# Patient Record
Sex: FEMALE | Race: WHITE
[De-identification: names, ages, dates, MRNs, and addresses within clinical notes are randomized per-mention and may not be internally consistent; named-entity substitution may affect disease eponyms.]

---

## 2018-04-10 ENCOUNTER — HOSPITAL ENCOUNTER (EMERGENCY)
Dept: HOSPITAL 58 - ED | Age: 28
Discharge: HOME | End: 2018-04-10
Payer: COMMERCIAL

## 2018-04-10 VITALS — TEMPERATURE: 98.1 F | DIASTOLIC BLOOD PRESSURE: 58 MMHG | SYSTOLIC BLOOD PRESSURE: 112 MMHG

## 2018-04-10 VITALS — BODY MASS INDEX: 23.5 KG/M2

## 2018-04-10 DIAGNOSIS — N20.1: Primary | ICD-10-CM

## 2018-04-10 DIAGNOSIS — Z87.440: ICD-10-CM

## 2018-04-10 PROCEDURE — 84703 CHORIONIC GONADOTROPIN ASSAY: CPT

## 2018-04-10 PROCEDURE — 96375 TX/PRO/DX INJ NEW DRUG ADDON: CPT

## 2018-04-10 PROCEDURE — 81001 URINALYSIS AUTO W/SCOPE: CPT

## 2018-04-10 PROCEDURE — 96374 THER/PROPH/DIAG INJ IV PUSH: CPT

## 2018-04-10 PROCEDURE — 80053 COMPREHEN METABOLIC PANEL: CPT

## 2018-04-10 PROCEDURE — 36415 COLL VENOUS BLD VENIPUNCTURE: CPT

## 2018-04-10 PROCEDURE — 99283 EMERGENCY DEPT VISIT LOW MDM: CPT

## 2018-04-10 PROCEDURE — 85025 COMPLETE CBC W/AUTO DIFF WBC: CPT

## 2018-04-10 NOTE — ED.PDOC
General


ED Provider: 


Dr. KATERYNA VALENZUELA





Chief Complaint: Urinary Problem


Stated Complaint: Severe LLQ abdominal Pain and Lt Flank Pain. SUDDEN ONSET 

APPROXIMATELY 1 HOUR AGO. FEELS LIKE A UTI WITH BLADDE PRESSURE AND BURNING. 

TREATED FOR UTI LAST MONTH AT PCP'S OFFICE. HX OF PREV UTI SIMILAR SYMPTOMS.


Time Seen by Physician: 17:10


Mode of Arrival: Walk-In


Information Source: Patient


Exam Limitations: No limitations


[facility] 


[facility]





Nursing and Triage Documentation Reviewed and Agree: Yes


Reviewed sepsis parameters & appropriate labs ordered?: Yes


System Inflammatory Response Syndrome: Not Applicable


Sepsis Protocol: 


For patient's 13 years and over:





Temp is 96.8 and below  and greater


Pulse >90 BPM


Resp >20/minute


Acutely Altered Mental Status





Are patient's symptoms suggestive of a new infection, such as:


   -Pneumonia


   -Skin, Soft Tissue


   -Endocarditis


   -UTI


   -Bone, Joint Infection


   -Implantable Device


   -Acute Abdominal Infection


   -Wound Infection


   -Meningitis


   -Blood Stream Catheter Infection


   -Unknown





System Inflammatory Response Syndrome: Not Applicable





 Complaint Exam





- UTI Female Complaint/Exam


Patient Complains of: Reports: Painful urination


Onset/Duration: 1 HOUR


Symptoms Are: Worse


Timing: Constant


Initial Severity: Severe


Current Severity: Moderate


Location of Pain: Reports: Left, Flank, Suprapubic


Associated Signs and Symptoms: Reports: Flank pain.  Denies: Fever, Chills, 

Dyspareunia, Vaginal discharge


: 1


Para: 1


Related History: Reports: Similar episode


Related Surgical History: Reports: None


CVA Tenderness: Yes


Suprapubic Tenderness: Yes


Differential Diagnoses: Bladder Dysfunction, Cystitis, Pyelonephritis, Ureteral 

Calculus





Review of Systems





- Review Of Systems


Constitutional: Reports: No symptoms


Eyes: Reports: No symptoms


Ears, Nose, Mouth, Throat: Reports: No symptoms


Respiratory: Reports: No symptoms


Cardiac: Reports: No symptoms


GI: Reports: No symptoms


: Reports: No symptoms, Burning, Dysuria, Frequency, Flank pain, Pain, Urgency


Musculoskeletal: Reports: No symptoms


Skin: Reports: No symptoms


Neurological: Reports: No symptoms


Endocrine: Reports: No symptoms


Hematologic/Lymphatic: Reports: No symptoms


All Other Systems: Reviewed and Negative





Past Medical History





- Past Medical History


Endocrine: Reports: None


Cardiovascular: Reports: None


Respiratory: Reports: None


Hematological: Reports: None


Gastrointestinal: Reports: None


Genitourinary: Reports: UTI


Neuro/Psych: Reports: None


Musculoskeletal: Reports: None


Cancer: Reports: None


Last Menstrual Period: depo shot





- Surgical History


General Surgical History: Reports: None





- Family History


Family History: Reports: None





- Social History


Smoking Status: Never smoker


Hx Substance Use: No


Alcohol Screening: None





Physical Exam





- Physical Exam


Appearance: Well-appearing, No pain distress, Well-nourished


Ill-appearing: Moderate


Pain Distress: Severe


Eyes: RONALD, EOMI, Conjunctiva clear


ENT: Ears normal, Nose normal, Oropharynx normal


Respiratory: Airway patent, Breath sounds clear, Breath sounds equal, 

Respirations nonlabored


Cardiovascular: RRR, Pulses normal, No rub, No murmur


GI/: Soft, No masses, Bowel sounds normal, No Organomegaly, Tender (LLQ AND 

LT FLANK)


Musculoskeletal: Normal strength, ROM intact, No edema, No calf tenderness


Skin: Warm, Dry, Normal color


Neurological: Sensation intact, Motor intact, Reflexes intact, Cranial nerves 

intact, Alert, Oriented


Psychiatric: Affect appropriate, Mood appropriate





Critical Care Note





- Critical Care Note


Total Time (mins): 0





Course





- Course


Hematology/Chemistry: 


 04/10/18 17:35





 04/10/18 17:35


Orders, Labs, Meds: 


Lab Review











  04/10/18 04/10/18 04/10/18





  17:20 17:35 17:35


 


WBC   13.96 H 


 


RBC   4.64 


 


Hgb   14.4 


 


Hct   39.9 


 


MCV   86.0 


 


MCH   31.0 


 


MCHC   36.1 H 


 


RDW Coeff of Praveen   12.1 


 


Plt Count   233 


 


Immature Gran % (Auto)   0.5 


 


Neut % (Auto)   76.9 


 


Lymph % (Auto)   17.2 


 


Mono % (Auto)   4.6 


 


Eos % (Auto)   0.4 


 


Baso % (Auto)   0.4 


 


Immature Gran # (Auto)   0.1 


 


Neut # (Auto)   10.8 H 


 


Lymph # (Auto)   2.4 


 


Mono # (Auto)   0.6 


 


Eos # (Auto)   0.1 


 


Baso # (Auto)   0.1 


 


Sodium    142


 


Potassium    3.9


 


Chloride    109 H


 


Carbon Dioxide    21


 


Anion Gap    15.9


 


BUN    16


 


Creatinine    0.95


 


Estimated GFR (MDRD)    72.00


 


BUN/Creatinine Ratio    16.84


 


Glucose    120 H


 


Calcium    9.2


 


Total Bilirubin    0.8


 


AST    20


 


ALT    14


 


Alkaline Phosphatase    63


 


Total Protein    7.4


 


Albumin    4.3


 


Globulin    3.1


 


Albumin/Globulin Ratio    1.39


 


Serum Pregnancy, Qual   


 


Urine Color  Yellow  


 


Urine Clarity  Clear  


 


Urine pH  5.5  


 


Ur Specific Gravity  >=1.030  


 


Urine Protein  1+  


 


Urine Glucose (UA)  Negative  


 


Urine Ketones  1+  


 


Urine Blood  1+  


 


Urine Nitrite  Negative  


 


Urine Bilirubin  1+  


 


Urine Urobilinogen  0.2  


 


Ur Leukocyte Esterase  Negative  


 


Ur Squamous Epith Cells  2-5  


 


Calcium Oxalate Crystal  4+  














  04/10/18





  17:35


 


WBC 


 


RBC 


 


Hgb 


 


Hct 


 


MCV 


 


MCH 


 


MCHC 


 


RDW Coeff of Praveen 


 


Plt Count 


 


Immature Gran % (Auto) 


 


Neut % (Auto) 


 


Lymph % (Auto) 


 


Mono % (Auto) 


 


Eos % (Auto) 


 


Baso % (Auto) 


 


Immature Gran # (Auto) 


 


Neut # (Auto) 


 


Lymph # (Auto) 


 


Mono # (Auto) 


 


Eos # (Auto) 


 


Baso # (Auto) 


 


Sodium 


 


Potassium 


 


Chloride 


 


Carbon Dioxide 


 


Anion Gap 


 


BUN 


 


Creatinine 


 


Estimated GFR (MDRD) 


 


BUN/Creatinine Ratio 


 


Glucose 


 


Calcium 


 


Total Bilirubin 


 


AST 


 


ALT 


 


Alkaline Phosphatase 


 


Total Protein 


 


Albumin 


 


Globulin 


 


Albumin/Globulin Ratio 


 


Serum Pregnancy, Qual  Negative


 


Urine Color 


 


Urine Clarity 


 


Urine pH 


 


Ur Specific Gravity 


 


Urine Protein 


 


Urine Glucose (UA) 


 


Urine Ketones 


 


Urine Blood 


 


Urine Nitrite 


 


Urine Bilirubin 


 


Urine Urobilinogen 


 


Ur Leukocyte Esterase 


 


Ur Squamous Epith Cells 


 


Calcium Oxalate Crystal 








Orders











 Category Date Time Status


 


 IV [ED IV/MEDIPORT/POWERPORT] .ONCE EMERGENCY  04/10/18 17:22 Active


 


 CBC W/ AUTO DIFF Stat LAB  04/10/18 17:35 Completed


 


 CMP [COMPREHENSIVE METABOLIC PANEL] Stat LAB  04/10/18 17:35 Completed


 


 HCG QUALITATIVE [SERUM PREGNANCY] Stat LAB  04/10/18 17:35 Completed


 


 UA [URINALYSIS C & S IF INDICATED] Stat LAB  04/10/18 17:20 Completed


 


 0.9 % Sodium Chloride [Saline Flush] MEDS  04/10/18 17:22 Ordered





 1 syr IVF PRN PRN   


 


 Ketorolac Tromethamine [Toradol] MEDS  04/10/18 17:24 Discontinued





 30 mg IVP ONCE STA   


 


 Ondansetron HCl/Pf [Zofran 4 mg/2 ml] MEDS  04/10/18 17:24 Discontinued





 4 mg IVP ONCE STA   


 


 CT ABDOMEN/PELVIS WO CONTRAST Stat RADS  04/10/18 18:37 Completed








Medications











Generic Name Dose Route Start Last Admin





  Trade Name Freq  PRN Reason Stop Dose Admin


 


Sodium Chloride  1 syr  04/10/18 17:22  04/10/18 17:45





  Saline Flush  IVF   1 syr





  PRN PRN   Administration





  To flush IV   














Discontinued Medications














Generic Name Dose Route Start Last Admin





  Trade Name Fremae  PRN Reason Stop Dose Admin


 


Ketorolac Tromethamine  30 mg  04/10/18 17:24  04/10/18 17:48





  Toradol  IVP  04/10/18 17:25  30 mg





  ONCE STA   Administration


 


Ondansetron HCl  4 mg  04/10/18 17:24  04/10/18 17:46





  Zofran 4 Mg/2 Ml  IVP  04/10/18 17:25  4 mg





  ONCE STA   Administration











Vital Signs: 


 











  Temp Pulse Resp BP Pulse Ox


 


 04/10/18 16:27  98.1 F  52 L  20  112/58 L  97














Departure





- Departure


Time of Disposition: 19:40


Disposition: HOME SELF-CARE


Discharge Problem: 


 Ureterolithiasis





Instructions:  Kidney Stones (ED), How to Strain Your Urine (ED)


Condition: Good


Pt referred to PMD for follow-up: Yes (DR RODGERS AND UROLOGIST DR MOSES IN 

[place])


IPMP verified?: No


Additional Instructions: 


STRAIN ALL URINE


TAKE FLOMAX AT HOME 1 DAILY UNTIL STONE PASSES THEN STOP FLOMAX


FOLLOW UP WITH UROLOGIST AT [facility]


DR MATHIS


RETURN TO ER IF ACUTE EVENT OCCURS


Allergies/Adverse Reactions: 


Allergies





No Known Allergies Allergy (Verified 04/10/18 16:32)


 








Home Medications: 


Ambulatory Orders





Medroxyprogesterone Acetate [Depo-Provera] 150 mg IM AS DIRECTED 16 


Hydrocodone Bit/Acetaminophen [Norco 5-325] 1 each PO Q4HR #5 tablet 04/10/18 


Tamsulosin HCl [Flomax] 0.4 mg PO DAILY #5 cap.er.24h 04/10/18 








Disposition Discussed With: Patient, Family

## 2018-04-10 NOTE — CT
EXAM:  CT of the abdomen and pelvis without contrast. 

  

HISTORY:  Left lower quadrant abdominal pain. 

  

PROCEDURE:  Contiguous axial CT images of the abdomen and pelvis without contrast with coronal and sa
gittal reformats. 

  

FINDINGS: The liver, gallbladder, pancreas, spleen and adrenal glands are normal in appearance.  Ther
e is a 3 mm calcification in the distal left ureter at the level of the ureterovesicle junction with 
mild left hydronephrosis and hydroureter.  There are nonobstructive calcifications in both kidneys me
asuring up to 3 mm. The abdominal aorta is normal in appearance. The visualized loops of bowel and ap
pendix are normal in appearance. No free fluid or free air in the abdomen or pelvis. The bladder is m
inimally filled which limits the evaluation.  The uterus is unremarkable. The bones and soft tissues 
are unremarkable. 

  

Impression:  Left ureterolithiasis as described with mild left hydronephrosis and hydroureter. 

  

Nonobstructive bilateral nephrolithiasis.